# Patient Record
Sex: FEMALE | Race: WHITE | NOT HISPANIC OR LATINO | Employment: FULL TIME | URBAN - METROPOLITAN AREA
[De-identification: names, ages, dates, MRNs, and addresses within clinical notes are randomized per-mention and may not be internally consistent; named-entity substitution may affect disease eponyms.]

---

## 2018-08-24 ENCOUNTER — HOSPITAL ENCOUNTER (EMERGENCY)
Facility: HOSPITAL | Age: 53
Discharge: HOME | End: 2018-08-24
Attending: EMERGENCY MEDICINE | Admitting: EMERGENCY MEDICINE

## 2018-08-24 ENCOUNTER — APPOINTMENT (OUTPATIENT)
Dept: GENERAL RADIOLOGY | Facility: HOSPITAL | Age: 53
End: 2018-08-24

## 2018-08-24 ENCOUNTER — APPOINTMENT (OUTPATIENT)
Dept: CT IMAGING | Facility: HOSPITAL | Age: 53
End: 2018-08-24

## 2018-08-24 VITALS
HEIGHT: 68 IN | BODY MASS INDEX: 23.81 KG/M2 | TEMPERATURE: 98.3 F | WEIGHT: 157.13 LBS | SYSTOLIC BLOOD PRESSURE: 117 MMHG | DIASTOLIC BLOOD PRESSURE: 80 MMHG | OXYGEN SATURATION: 99 % | RESPIRATION RATE: 18 BRPM | HEART RATE: 64 BPM

## 2018-08-24 DIAGNOSIS — K20.90 ESOPHAGITIS: Primary | ICD-10-CM

## 2018-08-24 PROCEDURE — 71275 CT ANGIOGRAPHY CHEST: CPT

## 2018-08-24 PROCEDURE — 99284 EMERGENCY DEPT VISIT MOD MDM: CPT

## 2018-08-24 PROCEDURE — 93005 ELECTROCARDIOGRAM TRACING: CPT

## 2018-08-24 PROCEDURE — 85610 PROTHROMBIN TIME: CPT | Performed by: EMERGENCY MEDICINE

## 2018-08-24 PROCEDURE — 0 IOPAMIDOL PER 1 ML: Performed by: EMERGENCY MEDICINE

## 2018-08-24 PROCEDURE — 93010 ELECTROCARDIOGRAM REPORT: CPT | Performed by: INTERNAL MEDICINE

## 2018-08-24 PROCEDURE — 85025 COMPLETE CBC W/AUTO DIFF WBC: CPT | Performed by: EMERGENCY MEDICINE

## 2018-08-24 PROCEDURE — 71046 X-RAY EXAM CHEST 2 VIEWS: CPT

## 2018-08-24 PROCEDURE — 83690 ASSAY OF LIPASE: CPT | Performed by: EMERGENCY MEDICINE

## 2018-08-24 PROCEDURE — 84484 ASSAY OF TROPONIN QUANT: CPT | Performed by: EMERGENCY MEDICINE

## 2018-08-24 PROCEDURE — 80053 COMPREHEN METABOLIC PANEL: CPT | Performed by: EMERGENCY MEDICINE

## 2018-08-24 PROCEDURE — 93005 ELECTROCARDIOGRAM TRACING: CPT | Performed by: EMERGENCY MEDICINE

## 2018-08-24 RX ORDER — PANTOPRAZOLE SODIUM 40 MG/1
40 TABLET, DELAYED RELEASE ORAL 2 TIMES DAILY
Qty: 60 TABLET | Refills: 0 | Status: SHIPPED | OUTPATIENT
Start: 2018-08-24 | End: 2021-06-08 | Stop reason: ALTCHOICE

## 2018-08-24 RX ORDER — MEMANTINE HYDROCHLORIDE 10 MG/1
10 TABLET ORAL 2 TIMES DAILY
COMMUNITY
End: 2021-06-08 | Stop reason: ALTCHOICE

## 2018-08-24 RX ADMIN — IOPAMIDOL 95 ML: 755 INJECTION, SOLUTION INTRAVENOUS at 10:18

## 2018-08-29 ENCOUNTER — TELEPHONE (OUTPATIENT)
Dept: SOCIAL WORK | Facility: HOSPITAL | Age: 53
End: 2018-08-29

## 2018-08-29 NOTE — TELEPHONE ENCOUNTER
ED f/u phone call to offer to arrange a PCP len't. Patient stated that she will work on this on her own. No questions/concerns

## 2020-12-16 ENCOUNTER — APPOINTMENT (OUTPATIENT)
Dept: WOMENS IMAGING | Facility: HOSPITAL | Age: 55
End: 2020-12-16

## 2020-12-16 PROCEDURE — 77063 BREAST TOMOSYNTHESIS BI: CPT | Performed by: RADIOLOGY

## 2020-12-16 PROCEDURE — 77067 SCR MAMMO BI INCL CAD: CPT | Performed by: RADIOLOGY

## 2021-06-08 ENCOUNTER — OFFICE VISIT (OUTPATIENT)
Dept: INTERNAL MEDICINE | Facility: CLINIC | Age: 56
End: 2021-06-08

## 2021-06-08 VITALS
HEART RATE: 78 BPM | BODY MASS INDEX: 24.86 KG/M2 | TEMPERATURE: 97.2 F | WEIGHT: 164 LBS | HEIGHT: 68 IN | DIASTOLIC BLOOD PRESSURE: 48 MMHG | SYSTOLIC BLOOD PRESSURE: 92 MMHG

## 2021-06-08 DIAGNOSIS — Z11.59 ENCOUNTER FOR HEPATITIS C SCREENING TEST FOR LOW RISK PATIENT: ICD-10-CM

## 2021-06-08 DIAGNOSIS — Z00.00 PHYSICAL EXAM, ANNUAL: ICD-10-CM

## 2021-06-08 DIAGNOSIS — Z12.11 SCREEN FOR COLON CANCER: ICD-10-CM

## 2021-06-08 DIAGNOSIS — R91.1 PULMONARY NODULE, RIGHT: Primary | ICD-10-CM

## 2021-06-08 PROCEDURE — 99386 PREV VISIT NEW AGE 40-64: CPT | Performed by: INTERNAL MEDICINE

## 2021-06-08 RX ORDER — DEXTROAMPHETAMINE SULFATE, DEXTROAMPHETAMINE SACCHARATE, AMPHETAMINE ASPARTATE MONOHYDRATE, AND AMPHETAMINE SULFATE 12.5; 12.5; 12.5; 12.5 MG/1; MG/1; MG/1; MG/1
1 CAPSULE, EXTENDED RELEASE ORAL DAILY
COMMUNITY

## 2021-06-08 RX ORDER — NITROFURANTOIN MACROCRYSTALS 50 MG/1
CAPSULE ORAL
COMMUNITY
Start: 2021-06-03

## 2021-06-08 RX ORDER — FAMOTIDINE 20 MG/1
20 TABLET, FILM COATED ORAL 2 TIMES DAILY
COMMUNITY

## 2021-06-08 RX ORDER — CHLORAL HYDRATE 500 MG
CAPSULE ORAL
COMMUNITY

## 2021-06-08 NOTE — PROGRESS NOTES
"Chief Complaint  New PT visit    Subjective          Suad Edwards presents to [facility]  History of Present Illness  Here to establish- hasn't had PCP in several years.  2 children 19 and 23-  Wants to get up to date on health maintenance issues.  Takes nitrofurantoin daily (most day) for recurrent UTI for about a year- urol is going to start to wean her.     Objective   Vital Signs:   BP 92/48   Pulse 78   Temp 97.2 °F (36.2 °C)   Ht 172.7 cm (68\")   Wt 74.4 kg (164 lb)   BMI 24.94 kg/m²     Physical Exam  Constitutional:       Appearance: Normal appearance.   Pulmonary:      Effort: Pulmonary effort is normal.        Result Review :                 Assessment and Plan    Diagnoses and all orders for this visit:    1. Pulmonary nodule, right (Primary)  Comments:  hasn't had f/u from 2018- will order  Orders:  -     CT Chest Without Contrast; Future    2. Screen for colon cancer  Comments:  referral made at her request  Orders:  -     Ambulatory Referral For Screening Colonoscopy    3. Physical exam, annual  Comments:  for f/u appt  Orders:  -     Comprehensive Metabolic Panel; Future  -     CBC & Differential; Future  -     Lipid Panel With / Chol / HDL Ratio; Future  -     Vitamin D 25 Hydroxy; Future  -     TSH; Future    4. Encounter for hepatitis C screening test for low risk patient  Comments:  ordered.  Orders:  -     Hepatitis C Antibody; Future        Follow Up   Return in about 2 months (around 8/8/2021) for Annual physical, Lab Before FUP.  Patient was given instructions and counseling regarding her condition or for health maintenance advice. Please see specific information pulled into the AVS if appropriate.       "

## 2021-06-24 ENCOUNTER — HOSPITAL ENCOUNTER (OUTPATIENT)
Dept: CT IMAGING | Facility: HOSPITAL | Age: 56
Discharge: HOME | End: 2021-06-24
Admitting: INTERNAL MEDICINE

## 2021-06-24 DIAGNOSIS — R91.1 PULMONARY NODULE, RIGHT: ICD-10-CM

## 2021-06-24 PROCEDURE — 71250 CT THORAX DX C-: CPT

## 2021-06-29 ENCOUNTER — TELEPHONE (OUTPATIENT)
Dept: INTERNAL MEDICINE | Facility: CLINIC | Age: 56
End: 2021-06-29

## 2021-06-29 NOTE — TELEPHONE ENCOUNTER
PATIENT RETURNED CALL REGARDING RESULTS OF CT SCAN.    PATIENT CALL BACK #: [phone] OR  [phone]

## 2021-08-30 ENCOUNTER — OFFICE VISIT (OUTPATIENT)
Dept: INTERNAL MEDICINE | Facility: CLINIC | Age: 56
End: 2021-08-30

## 2021-08-30 VITALS
WEIGHT: 166 LBS | SYSTOLIC BLOOD PRESSURE: 122 MMHG | HEIGHT: 68 IN | BODY MASS INDEX: 25.16 KG/M2 | DIASTOLIC BLOOD PRESSURE: 68 MMHG | TEMPERATURE: 96.9 F | HEART RATE: 84 BPM

## 2021-08-30 DIAGNOSIS — E03.9 HYPOTHYROIDISM, UNSPECIFIED TYPE: ICD-10-CM

## 2021-08-30 DIAGNOSIS — F98.8 ATTENTION DEFICIT DISORDER (ADD) WITHOUT HYPERACTIVITY: ICD-10-CM

## 2021-08-30 DIAGNOSIS — Z00.00 PHYSICAL EXAM, ANNUAL: ICD-10-CM

## 2021-08-30 DIAGNOSIS — K21.9 GASTROESOPHAGEAL REFLUX DISEASE, UNSPECIFIED WHETHER ESOPHAGITIS PRESENT: Primary | ICD-10-CM

## 2021-08-30 PROBLEM — R13.10 DYSPHAGIA: Status: ACTIVE | Noted: 2021-08-27

## 2021-08-30 PROBLEM — Z12.11 SCREENING FOR MALIGNANT NEOPLASM OF COLON: Status: ACTIVE | Noted: 2021-08-27

## 2021-08-30 PROBLEM — R13.10 SWALLOWING PAINFUL: Status: ACTIVE | Noted: 2021-08-27

## 2021-08-30 PROBLEM — R14.0 ABDOMINAL BLOATING: Status: RESOLVED | Noted: 2021-08-27 | Resolved: 2021-08-30

## 2021-08-30 PROBLEM — R14.0 ABDOMINAL BLOATING: Status: ACTIVE | Noted: 2021-08-27

## 2021-08-30 PROCEDURE — 99396 PREV VISIT EST AGE 40-64: CPT | Performed by: INTERNAL MEDICINE

## 2021-08-30 RX ORDER — OMEPRAZOLE 20 MG/1
20 CAPSULE, DELAYED RELEASE ORAL
COMMUNITY
Start: 2021-08-27 | End: 2022-08-27

## 2021-08-30 RX ORDER — LEVOTHYROXINE SODIUM 0.07 MG/1
75 TABLET ORAL DAILY
Qty: 90 TABLET | Refills: 1 | Status: SHIPPED | OUTPATIENT
Start: 2021-08-30 | End: 2022-03-03

## 2021-08-30 NOTE — PROGRESS NOTES
Subjective   Suad Edwards is a 56 y.o. female and is here for a comprehensive physical exam. The patient reports problems - fatigue.  She finally saw Dr. Love- has appt for EGD and c-scope - having some trouble with dysphagia- now on PPI and H2 blocker.  Pt is UTD with annual gyn exam and mammo - Dr. Joshua  She feels a bit more tired than normal and very dry skin and nails.  Able to work.          Social History     Socioeconomic History   • Marital status:      Spouse name: Not on file   • Number of children: Not on file   • Years of education: Not on file   • Highest education level: Not on file   Tobacco Use   • Smoking status: Never Smoker   • Smokeless tobacco: Never Used   Substance and Sexual Activity   • Alcohol use: No   • Drug use: No   • Sexual activity: Yes     Partners: Male     Birth control/protection: Surgical     Comment: vasectomy       Family History   Problem Relation Age of Onset   • Cancer Mother 67        breast   • Heart disease Father 90   • Diabetes Father           Past Medical History:   Diagnosis Date   • ADHD    • Depression           Current Outpatient Medications:   •  Amphet-Dextroamphet 3-Bead ER (Mydayis) 50 MG capsule sustained-release 24 hr, Take 1 capsule by mouth Daily., Disp: , Rfl:   •  BuPROPion HCl (WELLBUTRIN PO), Take 150 mg by mouth 3 (Three) Times a Day., Disp: , Rfl:   •  famotidine (PEPCID) 20 MG tablet, Take 20 mg by mouth 2 (Two) Times a Day., Disp: , Rfl:   •  Omega-3 Fatty Acids (fish oil) 1000 MG capsule capsule, Take  by mouth Daily With Breakfast., Disp: , Rfl:   •  omeprazole (priLOSEC) 20 MG capsule, Take 20 mg by mouth., Disp: , Rfl:   •  levothyroxine (Synthroid) 75 MCG tablet, Take 1 tablet by mouth Daily., Disp: 90 tablet, Rfl: 1  •  nitrofurantoin (MACRODANTIN) 50 MG capsule, , Disp: , Rfl:     Review of Systems    Review of Systems   Constitutional: Negative for fatigue and unexpected weight loss.   HENT: Positive for trouble  "swallowing.    Eyes: Negative for visual disturbance.   Respiratory: Negative for shortness of breath.    Cardiovascular: Negative for chest pain and palpitations.   Gastrointestinal: Negative for abdominal pain and blood in stool.   Endocrine: Negative for cold intolerance.   Genitourinary: Negative for breast lump and decreased libido.   Musculoskeletal: Negative for arthralgias and gait problem.   Neurological: Negative for memory problem.   Psychiatric/Behavioral: Negative for depressed mood.        There were no vitals filed for this visit.    Vitals:    08/30/21 0851   BP: 122/68   Pulse: 84   Temp: 96.9 °F (36.1 °C)   Weight: 75.3 kg (166 lb)   Height: 172.7 cm (68\")     Body mass index is 25.24 kg/m².  Wt Readings from Last 3 Encounters:   08/30/21 75.3 kg (166 lb)   06/08/21 74.4 kg (164 lb)   08/24/18 71.3 kg (157 lb 2 oz)      Objective     Physical Exam  Constitutional:       General: She is not in acute distress.  Eyes:      Conjunctiva/sclera: Conjunctivae normal.   Neck:      Thyroid: No thyromegaly or thyroid tenderness.   Cardiovascular:      Rate and Rhythm: Normal rate and regular rhythm.      Heart sounds: Normal heart sounds.   Pulmonary:      Effort: Pulmonary effort is normal.      Breath sounds: Normal breath sounds.   Abdominal:      General: Bowel sounds are normal.      Palpations: Abdomen is soft.   Lymphadenopathy:      Cervical: No cervical adenopathy.   Skin:     General: Skin is warm and dry.   Neurological:      Mental Status: She is alert and oriented to person, place, and time.   Psychiatric:         Behavior: Behavior normal.         Thought Content: Thought content normal.         Judgment: Judgment normal.         Procedures       Assessment/Plan         Diagnoses and all orders for this visit:    1. Gastroesophageal reflux disease, unspecified whether esophagitis present (Primary)  Comments:  await EGD- less symptoms on PPI currently    2. Hypothyroidism, unspecified " type  Comments:  new finding, will start levothyroxine 75 mcg daily and recheck in 3 mnths.  Orders:  -     TSH; Future  -     levothyroxine (Synthroid) 75 MCG tablet; Take 1 tablet by mouth Daily.  Dispense: 90 tablet; Refill: 1    3. Attention deficit disorder (ADD) without hyperactivity  Comments:  stable treatment per psychiatry    4. Physical exam, annual  Comments:  Discussed exercise recommendations- Shingrix vacc recommended after 3rd Cvid.  Recheck 3 months with TSH        Return in about 3 months (around 11/30/2021) for Recheck, Lab Before FUP.

## 2021-11-23 DIAGNOSIS — E03.9 HYPOTHYROIDISM, UNSPECIFIED TYPE: ICD-10-CM

## 2021-11-25 LAB — TSH SERPL DL<=0.005 MIU/L-ACNC: 1.72 UIU/ML (ref 0.45–4.5)

## 2021-12-01 ENCOUNTER — OFFICE VISIT (OUTPATIENT)
Dept: INTERNAL MEDICINE | Facility: CLINIC | Age: 56
End: 2021-12-01

## 2021-12-01 VITALS
HEART RATE: 70 BPM | HEIGHT: 68 IN | WEIGHT: 164 LBS | TEMPERATURE: 96.3 F | DIASTOLIC BLOOD PRESSURE: 64 MMHG | BODY MASS INDEX: 24.86 KG/M2 | SYSTOLIC BLOOD PRESSURE: 100 MMHG

## 2021-12-01 DIAGNOSIS — E03.9 HYPOTHYROIDISM, UNSPECIFIED TYPE: Primary | ICD-10-CM

## 2021-12-01 DIAGNOSIS — Z00.00 PHYSICAL EXAM, ANNUAL: ICD-10-CM

## 2021-12-01 DIAGNOSIS — K21.9 GASTROESOPHAGEAL REFLUX DISEASE, UNSPECIFIED WHETHER ESOPHAGITIS PRESENT: ICD-10-CM

## 2021-12-01 PROCEDURE — 99213 OFFICE O/P EST LOW 20 MIN: CPT | Performed by: INTERNAL MEDICINE

## 2021-12-01 RX ORDER — BUPROPION HYDROCHLORIDE 150 MG/1
150 TABLET ORAL DAILY
COMMUNITY
Start: 2021-09-30 | End: 2022-10-25 | Stop reason: SDUPTHER

## 2021-12-01 RX ORDER — BUPROPION HYDROCHLORIDE 300 MG/1
TABLET ORAL
COMMUNITY
Start: 2021-11-03

## 2021-12-01 NOTE — PROGRESS NOTES
"Chief Complaint  Heartburn    Subjective          Suad Edwards presents to [facility]  History of Present Illness  Here for thyroid f/u and GI issues.  She isn't having any GI issues now, no issues with regurgitation since the dilation.    She takes Wellbutrin 300 mg daily, on occ she can take 150 mg.   Feels psychiatry has been good.     Objective   Vital Signs:   /64   Pulse 70   Temp 96.3 °F (35.7 °C)   Ht 172 cm (67.72\")   Wt 74.4 kg (164 lb)   BMI 25.14 kg/m²     Physical Exam  Constitutional:       Appearance: Normal appearance.   Cardiovascular:      Rate and Rhythm: Normal rate and regular rhythm.   Musculoskeletal:      Right lower leg: No edema.      Left lower leg: No edema.        Result Review :   The following data was reviewed by: Rehana Johnson MD on 12/01/2021:  Common labs    Common Labsle 8/23/21 8/23/21 8/23/21    1003 1003 1003   Glucose 92     BUN 13     Creatinine 0.92     eGFR Non  Am 64     eGFR African Am 77     Sodium 138     Potassium 4.4     Chloride 103     Calcium 9.9     Total Protein 7.6     Albumin 4.20     Total Bilirubin 0.3     Alkaline Phosphatase 87     AST (SGOT) 24     ALT (SGPT) 30     WBC  6.39    Hemoglobin  13.8    Hematocrit  40.8    Platelets  200    Total Cholesterol   234 (A)   Triglycerides   161 (A)   HDL Cholesterol   44   LDL Cholesterol    161 (A)   (A) Abnormal value       Comments are available for some flowsheets but are not being displayed.                     Assessment and Plan    Diagnoses and all orders for this visit:    1. Hypothyroidism, unspecified type (Primary)  Comments:  now well replaced, no change.   Orders:  -     TSH; Future    2. Physical exam, annual  -     Comprehensive Metabolic Panel; Future  -     Lipid Panel With / Chol / HDL Ratio; Future    3. Gastroesophageal reflux disease, unspecified whether esophagitis present  Comments:  feeling better, watching diet, etc.         Follow Up "   Return in about 1 year (around 12/1/2022) for Annual physical, Lab Before FUP.  Patient was given instructions and counseling regarding her condition or for health maintenance advice. Please see specific information pulled into the AVS if appropriate.

## 2022-02-16 ENCOUNTER — APPOINTMENT (OUTPATIENT)
Dept: WOMENS IMAGING | Facility: HOSPITAL | Age: 57
End: 2022-02-16

## 2022-02-16 PROCEDURE — 77067 SCR MAMMO BI INCL CAD: CPT | Performed by: RADIOLOGY

## 2022-02-16 PROCEDURE — 77063 BREAST TOMOSYNTHESIS BI: CPT | Performed by: RADIOLOGY

## 2022-03-03 DIAGNOSIS — E03.9 HYPOTHYROIDISM, UNSPECIFIED TYPE: ICD-10-CM

## 2022-03-03 RX ORDER — LEVOTHYROXINE SODIUM 0.07 MG/1
TABLET ORAL
Qty: 90 TABLET | Refills: 1 | Status: SHIPPED | OUTPATIENT
Start: 2022-03-03 | End: 2022-07-19

## 2022-07-18 DIAGNOSIS — E03.9 HYPOTHYROIDISM, UNSPECIFIED TYPE: ICD-10-CM

## 2022-07-19 RX ORDER — LEVOTHYROXINE SODIUM 0.07 MG/1
TABLET ORAL
Qty: 90 TABLET | Refills: 1 | Status: SHIPPED | OUTPATIENT
Start: 2022-07-19 | End: 2023-03-07

## 2022-09-08 ENCOUNTER — TELEPHONE (OUTPATIENT)
Dept: INTERNAL MEDICINE | Facility: CLINIC | Age: 57
End: 2022-09-08

## 2022-09-08 NOTE — TELEPHONE ENCOUNTER
Caller: Suad Edwards    Relationship: Self    Best call back number: [phone]    What was the call regarding: PATIENT CALLED TO SEE IF DR FAUST WOULD AGREE TO TAKE THE PATIENT'S DAUGHTER AS A PATIENT. PATIENT'S DAUGHTER IS GABRIEL EDWARDS, RINKU 01    Do you require a callback: YES

## 2022-10-25 ENCOUNTER — TELEMEDICINE (OUTPATIENT)
Dept: INTERNAL MEDICINE | Facility: CLINIC | Age: 57
End: 2022-10-25

## 2022-10-25 ENCOUNTER — TELEPHONE (OUTPATIENT)
Dept: INTERNAL MEDICINE | Facility: CLINIC | Age: 57
End: 2022-10-25

## 2022-10-25 VITALS — WEIGHT: 173 LBS | BODY MASS INDEX: 26.52 KG/M2

## 2022-10-25 DIAGNOSIS — U07.1 COVID-19 VIRUS INFECTION: Primary | ICD-10-CM

## 2022-10-25 PROCEDURE — 99213 OFFICE O/P EST LOW 20 MIN: CPT | Performed by: INTERNAL MEDICINE

## 2022-10-25 RX ORDER — OMEPRAZOLE 40 MG/1
40 CAPSULE, DELAYED RELEASE ORAL
COMMUNITY
Start: 2022-10-12

## 2022-10-25 NOTE — PROGRESS NOTES
"Chief Complaint  Covid-19 Home Monitoring Video Visit    Subjective        Suad Edwards presents to [facility]  History of Present Illness  Mode of Visit: Video  Location of patient: home  Location of provider: [facility]  You have chosen to receive care through a telehealth visit.  Does the patient consent to use a video/audio connection for your medical care today? Yes  The visit included audio and video interaction. No technical issues occurred during this visit.    had positive test a couple of days ago- has been wearing a mask. Negative test 2 days ago but symptoms of congestion, etc started this am, now positive.    No fever.  Current symptoms include congestion, sore throat, fatigue.    Has only taken ibuprofen.   She has vaccine and booster x2  Discussed quarantine for 5 days then tight fitting mask for 5 more days as long as no fever and symptoms have improved.     Objective   Vital Signs:  Wt 78.5 kg (173 lb)   BMI 26.52 kg/m²   Estimated body mass index is 26.52 kg/m² as calculated from the following:    Height as of 12/1/21: 172 cm (67.72\").    Weight as of this encounter: 78.5 kg (173 lb).          Physical Exam  Constitutional:       General: She is not in acute distress.     Appearance: Normal appearance. She is not ill-appearing.        Result Review :                Assessment and Plan   Diagnoses and all orders for this visit:    1. COVID-19 virus infection (Primary)  Comments:  pros/cons of antiviral discussed- will treat symptoms and monitor closely- to call with any worsening symptoms or questions.              Follow Up   No follow-ups on file.  Patient was given instructions and counseling regarding her condition or for health maintenance advice. Please see specific information pulled into the AVS if appropriate.       "

## 2022-10-25 NOTE — TELEPHONE ENCOUNTER
Caller: Suad Edwards    Relationship to patient: Self    Best call back number: [phone]    Date of exposure: N/A    Date of positive COVID19 test: 10/25/22    Date if possible COVID19 exposure: N/A    COVID19 symptoms: STUFFY NOSE, SORE THROAT, SCRATCHY THROAT. CHEST CONGESTION, COUGH, SNEEZING     Date of initial quarantine: 10/28/22    Additional information or concerns: PATIENT TESTED POSITIVE FOR COVID-19 TODAY AT HOME, AND SHE IS WANTING TO SEE IF THERE IS SOMETHING SHE CAN TAKE FOR THIS     SHE SAID HER SYMPTOMS STARTED YESTERDAY, 10/24/22    What is the patients preferred pharmacy: [facility]/pharmacy #[id number] - [place], [place] - [address] AT [facility] - [phone]  - [phone]   [phone]

## 2022-12-05 ENCOUNTER — OFFICE VISIT (OUTPATIENT)
Dept: INTERNAL MEDICINE | Facility: CLINIC | Age: 57
End: 2022-12-05

## 2022-12-05 VITALS
HEART RATE: 68 BPM | DIASTOLIC BLOOD PRESSURE: 58 MMHG | WEIGHT: 174 LBS | HEIGHT: 68 IN | SYSTOLIC BLOOD PRESSURE: 122 MMHG | BODY MASS INDEX: 26.37 KG/M2

## 2022-12-05 DIAGNOSIS — R79.89 ELEVATED LFTS: Primary | ICD-10-CM

## 2022-12-05 DIAGNOSIS — F98.8 ATTENTION DEFICIT DISORDER (ADD) WITHOUT HYPERACTIVITY: ICD-10-CM

## 2022-12-05 DIAGNOSIS — Z00.00 PHYSICAL EXAM, ANNUAL: ICD-10-CM

## 2022-12-05 PROCEDURE — 99396 PREV VISIT EST AGE 40-64: CPT | Performed by: INTERNAL MEDICINE

## 2022-12-05 NOTE — PROGRESS NOTES
Subjective   Suad Edwards is a 57 y.o. female and is here for a comprehensive physical exam. The patient reports no problems.  Feels back to baseline after having had Covid in October.    Pt is UTD with annual gyn exam and mammo - Dr. Joshua    Get Prevnar 20 and flu shot last week.       Social History     Socioeconomic History   • Marital status:    Tobacco Use   • Smoking status: Never   • Smokeless tobacco: Never   Substance and Sexual Activity   • Alcohol use: No   • Drug use: No   • Sexual activity: Yes     Partners: Male     Birth control/protection: Surgical     Comment: vasectomy       Family History   Problem Relation Age of Onset   • Cancer Mother 67        breast   • Heart disease Father 90   • Diabetes Father           Past Medical History:   Diagnosis Date   • ADHD    • Depression           Current Outpatient Medications:   •  Amphet-Dextroamphet 3-Bead ER (Mydayis) 50 MG capsule sustained-release 24 hr, Take 1 capsule by mouth Daily., Disp: , Rfl:   •  buPROPion XL (WELLBUTRIN XL) 300 MG 24 hr tablet,  mg Tab, Oral, [id number], 0 Refill(s), Disp: , Rfl:   •  famotidine (PEPCID) 20 MG tablet, Take 20 mg by mouth 2 (Two) Times a Day., Disp: , Rfl:   •  hyoscyamine (LEVSIN) 0.125 MG SL tablet, , Disp: , Rfl:   •  levothyroxine (SYNTHROID, LEVOTHROID) 75 MCG tablet, TAKE 1 TABLET BY MOUTH EVERY DAY, Disp: 90 tablet, Rfl: 1  •  nitrofurantoin (MACRODANTIN) 50 MG capsule, , Disp: , Rfl:   •  Omega-3 Fatty Acids (fish oil) 1000 MG capsule capsule, Take  by mouth Daily With Breakfast., Disp: , Rfl:   •  omeprazole (priLOSEC) 40 MG capsule, Take 1 capsule by mouth Every Morning Before Breakfast., Disp: , Rfl:     Review of Systems    Review of Systems   Constitutional: Negative for fatigue and unexpected weight loss.   Eyes: Negative for visual disturbance.   Respiratory: Negative for shortness of breath.    Cardiovascular: Negative for chest pain and palpitations.   Gastrointestinal: Negative for  "abdominal pain and blood in stool.   Endocrine: Negative for cold intolerance.   Genitourinary: Negative for breast lump and decreased libido.   Musculoskeletal: Negative for arthralgias and gait problem.   Neurological: Negative for memory problem.   Psychiatric/Behavioral: Negative for depressed mood.        There were no vitals filed for this visit.    Vitals:    12/05/22 1519   BP: 122/58   Pulse: 68   Weight: 78.9 kg (174 lb)   Height: 172 cm (67.72\")     Body mass index is 26.68 kg/m².  Wt Readings from Last 3 Encounters:   12/05/22 78.9 kg (174 lb)   10/25/22 78.5 kg (173 lb)   12/01/21 74.4 kg (164 lb)      Objective     Physical Exam  Constitutional:       General: She is not in acute distress.  Eyes:      Conjunctiva/sclera: Conjunctivae normal.   Neck:      Thyroid: No thyromegaly.   Cardiovascular:      Rate and Rhythm: Normal rate and regular rhythm.      Heart sounds: Normal heart sounds.   Pulmonary:      Effort: Pulmonary effort is normal.      Breath sounds: Normal breath sounds.   Abdominal:      General: Bowel sounds are normal.      Palpations: Abdomen is soft.   Lymphadenopathy:      Cervical: No cervical adenopathy.   Skin:     General: Skin is warm and dry.   Neurological:      Mental Status: She is alert and oriented to person, place, and time.   Psychiatric:         Behavior: Behavior normal.         Thought Content: Thought content normal.         Judgment: Judgment normal.         Procedures       Assessment & Plan         Diagnoses and all orders for this visit:    1. Elevated LFTs (Primary)  Comments:  recheck as there is no clear cause- if remains elevated, full evaluation.   Orders:  -     Comprehensive Metabolic Panel; Future    2. Attention deficit disorder (ADD) without hyperactivity  Comments:  remains stable on meds per psychiatry.     3. Physical exam, annual  Comments:  discussed increasing exercise to 150 min/week. Shingrix vaccine at pharmacy.  Recheck as above.         Return " in about 6 months (around 6/5/2023) for Recheck.

## 2022-12-06 PROBLEM — Z12.11 SCREENING FOR MALIGNANT NEOPLASM OF COLON: Status: RESOLVED | Noted: 2021-08-27 | Resolved: 2022-12-06

## 2023-03-07 DIAGNOSIS — E03.9 HYPOTHYROIDISM, UNSPECIFIED TYPE: ICD-10-CM

## 2023-03-07 RX ORDER — LEVOTHYROXINE SODIUM 0.07 MG/1
TABLET ORAL
Qty: 90 TABLET | Refills: 1 | Status: SHIPPED | OUTPATIENT
Start: 2023-03-07 | End: 2023-04-03 | Stop reason: SDUPTHER

## 2023-03-24 ENCOUNTER — APPOINTMENT (OUTPATIENT)
Dept: WOMENS IMAGING | Facility: HOSPITAL | Age: 58
End: 2023-03-24
Payer: COMMERCIAL

## 2023-03-24 PROCEDURE — 77067 SCR MAMMO BI INCL CAD: CPT | Performed by: RADIOLOGY

## 2023-03-24 PROCEDURE — 77063 BREAST TOMOSYNTHESIS BI: CPT | Performed by: RADIOLOGY

## 2023-04-03 DIAGNOSIS — E03.9 HYPOTHYROIDISM, UNSPECIFIED TYPE: ICD-10-CM

## 2023-04-03 RX ORDER — LEVOTHYROXINE SODIUM 0.07 MG/1
75 TABLET ORAL DAILY
Qty: 90 TABLET | Refills: 1 | Status: SHIPPED | OUTPATIENT
Start: 2023-04-03

## 2023-04-03 NOTE — TELEPHONE ENCOUNTER
Caller: Suad Edwards    Relationship: Self    Best call back number: [phone]     Requested Prescriptions:   Requested Prescriptions     Pending Prescriptions Disp Refills   • levothyroxine (SYNTHROID, LEVOTHROID) 75 MCG tablet 90 tablet 1     Sig: Take 1 tablet by mouth Daily.        Pharmacy where request should be sent: [facility]/PHARMACY #[id number] - [facility] [place] - [address]. AT [facility] [phone]  - [phone] FX     Last office visit with prescribing clinician: 12/5/2022   Last telemedicine visit with prescribing clinician: 6/7/2023   Next office visit with prescribing clinician: 6/7/2023     Additional details provided by patient:     Does the patient have less than a 3 day supply:  [x] Yes  [] No    Would you like a call back once the refill request has been completed: [x] Yes [] No    If the office needs to give you a call back, can they leave a voicemail: [x] Yes [] No    Shannon Perera Rep   04/03/23 12:52 EDT

## 2023-06-02 ENCOUNTER — TELEPHONE (OUTPATIENT)
Dept: INTERNAL MEDICINE | Facility: CLINIC | Age: 58
End: 2023-06-02

## 2023-06-02 NOTE — TELEPHONE ENCOUNTER
Caller: Suad Edwards    Relationship: Self    Best call back number: [phone]     What was the call regarding: PATIENT CALLING WANTING TO KNOW IF SHE NEEDS LAB PRIOR TO HER APPOINTMENT. IF NOT ANSWER PLEASE LEAVER MESSAGE PER PATIENT REQUEST

## 2023-06-05 ENCOUNTER — OFFICE VISIT (OUTPATIENT)
Dept: INTERNAL MEDICINE | Facility: CLINIC | Age: 58
End: 2023-06-05
Payer: COMMERCIAL

## 2023-06-05 VITALS
BODY MASS INDEX: 26.14 KG/M2 | WEIGHT: 172.5 LBS | HEIGHT: 68 IN | TEMPERATURE: 97 F | DIASTOLIC BLOOD PRESSURE: 70 MMHG | SYSTOLIC BLOOD PRESSURE: 124 MMHG

## 2023-06-05 DIAGNOSIS — R79.89 ABNORMAL LFTS: Primary | ICD-10-CM

## 2023-06-05 DIAGNOSIS — K21.9 GASTROESOPHAGEAL REFLUX DISEASE, UNSPECIFIED WHETHER ESOPHAGITIS PRESENT: ICD-10-CM

## 2023-06-05 PROCEDURE — 99214 OFFICE O/P EST MOD 30 MIN: CPT | Performed by: INTERNAL MEDICINE

## 2023-06-05 NOTE — PROGRESS NOTES
"Chief Complaint  abnormal lft    Subjective        MareDAVID Edwards presents to [facility]  History of Present Illness here for f/u of abnormal LFTs- she did not have lab work done again but is scheduled today.   She has been able to change schedule to work 4 days- hoping to use that extra day to add more exercise.  Started intermittent fasting- can't tell much difference in weight but doesn't feel any RUQ pain like she did before.   She is on compounded HRT (DHEA, estrogen cream and progesterone) since April- feels energy is better.   Takes nitrofuantoin daily per urology- trying to get off per HRT. She had a mmg prior to starting the HRT.   Takes omeprazole daily, hasn't needed famotidine- hyoscyamine prn swallowing issues.     Objective   Vital Signs:  /70   Temp 97 °F (36.1 °C)   Ht 172 cm (67.72\")   Wt 78.2 kg (172 lb 8 oz)   BMI 26.45 kg/m²   Estimated body mass index is 26.45 kg/m² as calculated from the following:    Height as of this encounter: 172 cm (67.72\").    Weight as of this encounter: 78.2 kg (172 lb 8 oz).             Physical Exam  Constitutional:       Appearance: Normal appearance.   Cardiovascular:      Rate and Rhythm: Normal rate and regular rhythm.   Pulmonary:      Effort: Pulmonary effort is normal.   Abdominal:      General: Bowel sounds are normal.      Palpations: There is no hepatomegaly.      Tenderness: There is no abdominal tenderness.      Result Review :                   Assessment and Plan   Diagnoses and all orders for this visit:    1. Abnormal LFTs (Primary)  Comments:  check labs today and address as needed. hopefully with dietary changes will be better    2. Gastroesophageal reflux disease, unspecified whether esophagitis present  Comments:  should consider decreasing the omeprazole as well- will f/u             Follow Up   Return in about 6 months (around 12/5/2023) for Annual physical, Lab Today, Lab Before FUP.  Patient was given " instructions and counseling regarding her condition or for health maintenance advice. Please see specific information pulled into the AVS if appropriate.

## 2023-12-20 ENCOUNTER — OFFICE VISIT (OUTPATIENT)
Dept: INTERNAL MEDICINE | Facility: CLINIC | Age: 58
End: 2023-12-20
Payer: COMMERCIAL

## 2023-12-20 VITALS
TEMPERATURE: 97.3 F | SYSTOLIC BLOOD PRESSURE: 104 MMHG | WEIGHT: 168.2 LBS | BODY MASS INDEX: 25.49 KG/M2 | DIASTOLIC BLOOD PRESSURE: 66 MMHG | HEIGHT: 68 IN | HEART RATE: 76 BPM

## 2023-12-20 DIAGNOSIS — Z23 NEED FOR INFLUENZA VACCINATION: ICD-10-CM

## 2023-12-20 DIAGNOSIS — M54.2 NECK PAIN: Primary | ICD-10-CM

## 2023-12-20 RX ORDER — CYCLOBENZAPRINE HCL 10 MG
10 TABLET ORAL 3 TIMES DAILY PRN
Qty: 30 TABLET | Refills: 0 | Status: SHIPPED | OUTPATIENT
Start: 2023-12-20

## 2023-12-20 NOTE — PROGRESS NOTES
"Chief Complaint  Back Pain    Subjective        Suad Edwards presents to [facility]  Back Pain     Fell on some stairs over the weekend- hit the back of her head and upper back- she has had some problems there on and off for a long time with some numbness in L arm- did PT years ago, uses traction prn now. No LOC. Ice and ibuprofen since. She has had some worsening of the chronic pain in the L arm- burning around elbow, can tingle into wrist/hand.  No headaches, blurred vision.   Years ago had MRI cervical spine- herniated disc, saw neurosurgeon- thought best tx with PT- She has periodically done the exercises.      Objective   Vital Signs:  /66   Pulse 76   Temp 97.3 °F (36.3 °C) (Temporal)   Ht 172 cm (67.72\")   Wt 76.3 kg (168 lb 3.2 oz)   BMI 25.79 kg/m²   Estimated body mass index is 25.79 kg/m² as calculated from the following:    Height as of this encounter: 172 cm (67.72\").    Weight as of this encounter: 76.3 kg (168 lb 3.2 oz).             Physical Exam  Constitutional:       Appearance: Normal appearance.   Cardiovascular:      Rate and Rhythm: Regular rhythm.   Musculoskeletal:      Left shoulder: Decreased range of motion: mild. Decreased strength.      Cervical back: No swelling, deformity, rigidity or tenderness. Decreased range of motion: with some tenderness.      Thoracic back: Normal.        Back:       Comments: Slight decrease in    Lymphadenopathy:      Cervical: No cervical adenopathy.        Result Review :                   Assessment and Plan   Diagnoses and all orders for this visit:    1. Neck pain (Primary)  Comments:  exacerbated by fall - will treat with topical diclofenac, muscle relaxer and PT. Further imaging if fails to improve.  Orders:  -     Ambulatory Referral to Physical Therapy Evaluate and treat    2. Need for influenza vaccination  -     Fluzone (or Fluarix & Flulaval for VFC) >6mos    Other orders  -     cyclobenzaprine " (FLEXERIL) 10 MG tablet; Take 1 tablet by mouth 3 (Three) Times a Day As Needed for Muscle Spasms.  Dispense: 30 tablet; Refill: 0             Follow Up   No follow-ups on file.  Patient was given instructions and counseling regarding her condition or for health maintenance advice. Please see specific information pulled into the AVS if appropriate.         Answers submitted by the patient for this visit:  Other (Submitted on 12/20/2023)  Please describe your symptoms.: Fell on concrete steps where I have been having problems with previously herniated disc  in neck.  Pain and weakness in L arm and shoulder since then. Symptoms have been there for a while but are worse now. Hit back of head but its better now.  Have you had these symptoms before?: Yes  How long have you been having these symptoms?: Greater than 2 weeks  Please list any medications you are currently taking for this condition.: Ibuprofem  Please describe any probable cause for these symptoms. : Falling on concrete steps aggrevated it. Previous neck problems due to everyday life.  Primary Reason for Visit (Submitted on 12/20/2023)  What is the primary reason for your visit?: Other

## 2024-01-03 DIAGNOSIS — E03.9 HYPOTHYROIDISM, UNSPECIFIED TYPE: ICD-10-CM

## 2024-01-03 RX ORDER — LEVOTHYROXINE SODIUM 0.07 MG/1
75 TABLET ORAL DAILY
Qty: 90 TABLET | Refills: 1 | Status: SHIPPED | OUTPATIENT
Start: 2024-01-03

## 2024-04-23 ENCOUNTER — TRANSCRIBE ORDERS (OUTPATIENT)
Dept: WOMENS IMAGING | Facility: HOSPITAL | Age: 59
End: 2024-04-23
Payer: COMMERCIAL

## 2024-04-23 ENCOUNTER — HOSPITAL ENCOUNTER (OUTPATIENT)
Dept: PET IMAGING | Facility: HOSPITAL | Age: 59
Discharge: HOME | End: 2024-04-23
Payer: COMMERCIAL

## 2024-04-23 ENCOUNTER — APPOINTMENT (OUTPATIENT)
Dept: WOMENS IMAGING | Facility: HOSPITAL | Age: 59
End: 2024-04-23
Payer: COMMERCIAL

## 2024-04-23 DIAGNOSIS — M81.0 HIGH RISK FOR FRACTURE DUE TO OSTEOPOROSIS BY DEXA SCAN: Primary | ICD-10-CM

## 2024-04-23 DIAGNOSIS — M81.0 HIGH RISK FOR FRACTURE DUE TO OSTEOPOROSIS BY DEXA SCAN: ICD-10-CM

## 2024-04-23 PROCEDURE — 77063 BREAST TOMOSYNTHESIS BI: CPT | Performed by: RADIOLOGY

## 2024-04-23 PROCEDURE — 77067 SCR MAMMO BI INCL CAD: CPT | Performed by: RADIOLOGY

## 2024-04-23 PROCEDURE — 77080 DXA BONE DENSITY AXIAL: CPT

## 2024-05-07 ENCOUNTER — OFFICE VISIT (OUTPATIENT)
Dept: INTERNAL MEDICINE | Facility: CLINIC | Age: 59
End: 2024-05-07
Payer: COMMERCIAL

## 2024-05-07 VITALS
HEIGHT: 68 IN | WEIGHT: 164 LBS | SYSTOLIC BLOOD PRESSURE: 122 MMHG | HEART RATE: 70 BPM | DIASTOLIC BLOOD PRESSURE: 68 MMHG | BODY MASS INDEX: 24.86 KG/M2

## 2024-05-07 DIAGNOSIS — F98.8 ATTENTION DEFICIT DISORDER (ADD) WITHOUT HYPERACTIVITY: ICD-10-CM

## 2024-05-07 DIAGNOSIS — E78.5 DYSLIPIDEMIA: Chronic | ICD-10-CM

## 2024-05-07 DIAGNOSIS — Z00.00 PHYSICAL EXAM, ANNUAL: ICD-10-CM

## 2024-05-07 DIAGNOSIS — E03.9 ACQUIRED HYPOTHYROIDISM: Primary | ICD-10-CM

## 2024-05-07 PROCEDURE — 99396 PREV VISIT EST AGE 40-64: CPT | Performed by: INTERNAL MEDICINE

## 2024-07-06 DIAGNOSIS — E03.9 HYPOTHYROIDISM, UNSPECIFIED TYPE: ICD-10-CM

## 2024-07-08 RX ORDER — LEVOTHYROXINE SODIUM 0.07 MG/1
75 TABLET ORAL DAILY
Qty: 90 TABLET | Refills: 3 | Status: SHIPPED | OUTPATIENT
Start: 2024-07-08

## 2025-02-11 ENCOUNTER — OFFICE VISIT (OUTPATIENT)
Dept: INTERNAL MEDICINE | Facility: CLINIC | Age: 60
End: 2025-02-11
Payer: COMMERCIAL

## 2025-02-11 VITALS — HEART RATE: 87 BPM | OXYGEN SATURATION: 98 % | WEIGHT: 164 LBS | BODY MASS INDEX: 24.86 KG/M2 | HEIGHT: 68 IN

## 2025-02-11 DIAGNOSIS — J01.00 ACUTE NON-RECURRENT MAXILLARY SINUSITIS: Primary | ICD-10-CM

## 2025-02-11 DIAGNOSIS — T36.95XA ANTIBIOTIC-INDUCED YEAST INFECTION: ICD-10-CM

## 2025-02-11 DIAGNOSIS — B37.9 ANTIBIOTIC-INDUCED YEAST INFECTION: ICD-10-CM

## 2025-02-11 LAB
EXPIRATION DATE: NORMAL
FLUAV AG UPPER RESP QL IA.RAPID: NOT DETECTED
FLUBV AG UPPER RESP QL IA.RAPID: NOT DETECTED
INTERNAL CONTROL: NORMAL
Lab: NORMAL
SARS-COV-2 AG UPPER RESP QL IA.RAPID: NOT DETECTED

## 2025-02-11 PROCEDURE — 99213 OFFICE O/P EST LOW 20 MIN: CPT | Performed by: STUDENT IN AN ORGANIZED HEALTH CARE EDUCATION/TRAINING PROGRAM

## 2025-02-11 PROCEDURE — 87428 SARSCOV & INF VIR A&B AG IA: CPT | Performed by: STUDENT IN AN ORGANIZED HEALTH CARE EDUCATION/TRAINING PROGRAM

## 2025-02-11 RX ORDER — BENZONATATE 100 MG/1
100 CAPSULE ORAL 3 TIMES DAILY PRN
Qty: 30 CAPSULE | Refills: 0 | Status: SHIPPED | OUTPATIENT
Start: 2025-02-11

## 2025-02-11 RX ORDER — FLUCONAZOLE 150 MG/1
TABLET ORAL
Qty: 2 TABLET | Refills: 0 | Status: SHIPPED | OUTPATIENT
Start: 2025-02-11

## 2025-02-11 NOTE — PROGRESS NOTES
"Chief Complaint  Sinusitis    Subjective        Suad Edwards presents to [facility]  History of Present Illness    Presents to clinic today with complaints of sinus congestion, dry cough, chest congestion.  She states that the symptoms been present for a little less than a week and she has been taking ibuprofen for body aches as well as Mucinex for decongestion.  She states symptoms have worsened over the past 2 to 3 days, she does have a coworker who currently is being treated for bacterial pneumonia.  She feels like she has some shortness of breath but denies any chest pain    Objective   Vital Signs:  Pulse 87   Ht 172 cm (67.72\")   Wt 74.4 kg (164 lb)   SpO2 98%   BMI 25.14 kg/m²   Estimated body mass index is 25.14 kg/m² as calculated from the following:    Height as of this encounter: 172 cm (67.72\").    Weight as of this encounter: 74.4 kg (164 lb).          Physical Exam  Vitals reviewed.   Constitutional:       General: She is not in acute distress.     Appearance: Normal appearance. She is ill-appearing. She is not toxic-appearing.   HENT:      Head: Normocephalic and atraumatic.      Nose: Congestion present.      Mouth/Throat:      Pharynx: No oropharyngeal exudate or posterior oropharyngeal erythema.   Eyes:      General: No scleral icterus.     Conjunctiva/sclera: Conjunctivae normal.   Cardiovascular:      Rate and Rhythm: Normal rate and regular rhythm.      Heart sounds: Normal heart sounds.   Pulmonary:      Effort: Pulmonary effort is normal. No respiratory distress.      Breath sounds: Normal breath sounds. No wheezing.   Skin:     General: Skin is warm and dry.   Neurological:      Mental Status: She is alert and oriented to person, place, and time.   Psychiatric:         Mood and Affect: Mood normal.         Behavior: Behavior normal.        Result Review :                Assessment and Plan   Diagnoses and all orders for this visit:    1. Acute " non-recurrent maxillary sinusitis (Primary)  -     amoxicillin-clavulanate (AUGMENTIN) 875-125 MG per tablet; Take 1 tablet by mouth 2 (Two) Times a Day for 7 days.  Dispense: 14 tablet; Refill: 0  -     benzonatate (Tessalon Perles) 100 MG capsule; Take 1 capsule by mouth 3 (Three) Times a Day As Needed for Cough.  Dispense: 30 capsule; Refill: 0  -     POCT SARS-CoV-2 Antigen STEPHANE + Flu    2. Antibiotic-induced yeast infection  -     fluconazole (DIFLUCAN) 150 MG tablet; Take 1 dose. May repeat 1 more day if needed.  Dispense: 2 tablet; Refill: 0      Will plan to treat with Augmentin for bacterial sinusitis, does have notable maxillary tenderness and significant congestion on exam, clear lungs to auscultation.  Will send in Diflucan given history of yeast infections with antibiotics             Follow Up   No follow-ups on file.  Patient was given instructions and counseling regarding her condition or for health maintenance advice. Please see specific information pulled into the AVS if appropriate.

## 2025-02-25 ENCOUNTER — OFFICE VISIT (OUTPATIENT)
Dept: INTERNAL MEDICINE | Facility: CLINIC | Age: 60
End: 2025-02-25
Payer: COMMERCIAL

## 2025-02-25 VITALS
DIASTOLIC BLOOD PRESSURE: 62 MMHG | WEIGHT: 163 LBS | HEART RATE: 74 BPM | BODY MASS INDEX: 25.58 KG/M2 | HEIGHT: 67 IN | SYSTOLIC BLOOD PRESSURE: 112 MMHG

## 2025-02-25 DIAGNOSIS — R05.2 SUBACUTE COUGH: Primary | ICD-10-CM

## 2025-02-25 PROCEDURE — 99213 OFFICE O/P EST LOW 20 MIN: CPT | Performed by: INTERNAL MEDICINE

## 2025-02-25 NOTE — PROGRESS NOTES
"Chief Complaint  URI    Subjective        Suad Edwards presents to [facility]  History of Present Illness has been sick for a few weeks- first viral then probably secondary bacterial infection.  Saw Dr. Butterfield who put her on Augmentin-completed 7 day course. Helped with a little of the cough but still feels like her chest is tight and that there is phlegm in her chest she can't mobilize. Coworker with similar, dx pneumonia. The sinus symptoms are better.   She does feel SOB. No recent fevers. She si doing Mucinex, fluids, humidifier, etc without relief.     Objective   Vital Signs:  /62   Pulse 74   Ht 170.2 cm (67\")   Wt 73.9 kg (163 lb)   BMI 25.53 kg/m²   Estimated body mass index is 25.53 kg/m² as calculated from the following:    Height as of this encounter: 170.2 cm (67\").    Weight as of this encounter: 73.9 kg (163 lb).          Physical Exam  Constitutional:       Appearance: Normal appearance. She is not ill-appearing.   Cardiovascular:      Rate and Rhythm: Normal rate and regular rhythm.   Pulmonary:      Effort: Pulmonary effort is normal.      Comments: Diminished BS throughout- some improvement after MN treatment  Lymphadenopathy:      Cervical: No cervical adenopathy.        Result Review :                Assessment and Plan   Diagnoses and all orders for this visit:    1. Subacute cough (Primary)  Comments:  suspect related to recent viral illness- will treat with Airspura and hydration initially. Reassess if not improving.    Other orders  -     Albuterol-Budesonide 90-80 MCG/ACT aerosol; Inhale 2 puffs Every 6 (Six) Hours As Needed (cough).  Dispense: 10.7 g; Refill: 0             Follow Up   No follow-ups on file.  Patient was given instructions and counseling regarding her condition or for health maintenance advice. Please see specific information pulled into the AVS if appropriate.           "

## 2025-05-13 ENCOUNTER — OFFICE VISIT (OUTPATIENT)
Dept: INTERNAL MEDICINE | Facility: CLINIC | Age: 60
End: 2025-05-13
Payer: COMMERCIAL

## 2025-05-13 VITALS
BODY MASS INDEX: 26.21 KG/M2 | WEIGHT: 167 LBS | HEART RATE: 84 BPM | SYSTOLIC BLOOD PRESSURE: 112 MMHG | DIASTOLIC BLOOD PRESSURE: 58 MMHG | HEIGHT: 67 IN

## 2025-05-13 DIAGNOSIS — Z23 NEED FOR SHINGLES VACCINE: ICD-10-CM

## 2025-05-13 DIAGNOSIS — E03.9 ACQUIRED HYPOTHYROIDISM: Chronic | ICD-10-CM

## 2025-05-13 DIAGNOSIS — Z00.00 PHYSICAL EXAM, ANNUAL: Primary | ICD-10-CM

## 2025-05-13 DIAGNOSIS — R74.01 ELEVATED TRANSAMINASE LEVEL: Chronic | ICD-10-CM

## 2025-05-13 DIAGNOSIS — E78.5 DYSLIPIDEMIA: ICD-10-CM

## 2025-05-13 PROCEDURE — 99396 PREV VISIT EST AGE 40-64: CPT | Performed by: INTERNAL MEDICINE

## 2025-05-13 PROCEDURE — 90750 HZV VACC RECOMBINANT IM: CPT | Performed by: INTERNAL MEDICINE

## 2025-05-13 RX ORDER — PROGESTERONE 100 MG/1
100 CAPSULE ORAL DAILY
COMMUNITY

## 2025-05-13 NOTE — PROGRESS NOTES
Subjective   Suad Edwards is a 60 y.o. female and is here for a comprehensive physical exam. The patient reports problems - HRT?? Seeing wellness center- gives her estrogen cream and progesterone. Mother had breast cancer but they felt she was at low risk and with sx could take replacement.  .  LFT has been mildly elevated since @ 2019- no known work up done.   Pt is UTD with annual gyn exam and mammo   Seeing gyn regularly for reflux- general reflux precautions, PPI  Dentist recommended that she have a sleep study- bruxism- does not have pain but report wear of teeth.   Tries to cut back on sugar, does not drink alcohol regularly.       Social History     Socioeconomic History    Marital status:    Tobacco Use    Smoking status: Never     Passive exposure: Never    Smokeless tobacco: Never   Vaping Use    Vaping status: Never Used   Substance and Sexual Activity    Alcohol use: No    Drug use: No    Sexual activity: Yes     Partners: Male     Birth control/protection: Surgical     Comment: vasectomy       Family History   Problem Relation Age of Onset    Cancer Mother 67        breast    Heart disease Father 90    Diabetes Father           Past Medical History:   Diagnosis Date    ADHD     Depression           Current Outpatient Medications:     Amphet-Dextroamphet 3-Bead ER (Mydayis) 50 MG capsule sustained-release 24 hr, Take 1 capsule by mouth Daily., Disp: , Rfl:     buPROPion XL (WELLBUTRIN XL) 300 MG 24 hr tablet,  mg Tab, Oral, [id number], 0 Refill(s), Disp: , Rfl:     nitrofurantoin (MACRODANTIN) 50 MG capsule, , Disp: , Rfl:     Omega-3 Fatty Acids (fish oil) 1000 MG capsule capsule, Take  by mouth Daily With Breakfast., Disp: , Rfl:     omeprazole (priLOSEC) 40 MG capsule, Take 1 capsule by mouth Every Morning Before Breakfast., Disp: , Rfl:     Progesterone (PROMETRIUM) 100 MG capsule, Take 1 capsule by mouth Daily., Disp: , Rfl:     Review of Systems    Review of Systems     There were no vitals  "filed for this visit.    Vitals:    05/13/25 1327   BP: 112/58   Pulse: 84   Weight: 75.8 kg (167 lb)   Height: 171 cm (67.34\")     Body mass index is 25.89 kg/m².  Wt Readings from Last 3 Encounters:   05/13/25 75.8 kg (167 lb)   02/25/25 73.9 kg (163 lb)   02/11/25 74.4 kg (164 lb)      Objective     Physical Exam  Constitutional:       General: She is not in acute distress.  Eyes:      Conjunctiva/sclera: Conjunctivae normal.   Neck:      Thyroid: No thyromegaly.   Cardiovascular:      Rate and Rhythm: Normal rate and regular rhythm.      Heart sounds: Normal heart sounds.   Pulmonary:      Effort: Pulmonary effort is normal.      Breath sounds: Normal breath sounds.   Abdominal:      General: Bowel sounds are normal.      Palpations: Abdomen is soft. There is no hepatomegaly.   Musculoskeletal:      Right lower leg: No edema.      Left lower leg: No edema.   Lymphadenopathy:      Cervical: No cervical adenopathy.   Skin:     General: Skin is warm and dry.   Neurological:      Mental Status: She is alert and oriented to person, place, and time.   Psychiatric:         Behavior: Behavior normal.         Thought Content: Thought content normal.         Judgment: Judgment normal.         Procedures       Assessment & Plan         Diagnoses and all orders for this visit:    1. Physical exam, annual (Primary)  Comments:  exam is favorable- encouraged to see gyn- vaccines reviewed. Keep working on diet/exercise. - goal 150 min/week    2. Elevated transaminase level  Comments:  Needs eval- FIB-4 1.9- will start with ultrasound  Orders:  -     US Liver; Future    3. Need for shingles vaccine  -     Shingrix Vaccine    4. Acquired hypothyroidism  Comments:  TSH at goal    5. Dyslipidemia  Comments:  labs reviewed from outside- at goal        No follow-ups on file.           "

## 2025-05-28 ENCOUNTER — HOSPITAL ENCOUNTER (OUTPATIENT)
Dept: ULTRASOUND IMAGING | Facility: HOSPITAL | Age: 60
Discharge: HOME | End: 2025-05-28
Payer: COMMERCIAL

## 2025-05-28 ENCOUNTER — TELEPHONE (OUTPATIENT)
Dept: INTERNAL MEDICINE | Facility: CLINIC | Age: 60
End: 2025-05-28

## 2025-05-28 DIAGNOSIS — R74.01 ELEVATED TRANSAMINASE LEVEL: Chronic | ICD-10-CM

## 2025-05-28 NOTE — TELEPHONE ENCOUNTER
Caller: Suad Edwards    Relationship: Self    Best call back number: [phone]     What orders are you requesting (i.e. lab or imaging): ULTRASOUND OF LIVER    In what timeframe would the patient need to come in: 06/04/25 AT 9AM     Where will you receive your lab/imaging services: [facility]     Additional notes: PATIENT IS REQUESTING ORDER BE SENT TO [facility] AT FAX NUMBER : [phone]

## 2025-06-05 DIAGNOSIS — R74.01 ELEVATED TRANSAMINASE LEVEL: Primary | ICD-10-CM

## 2025-06-12 LAB
A1AT SERPL-MCNC: 149 MG/DL (ref 101–187)
CERULOPLASMIN SERPL-MCNC: 33.4 MG/DL (ref 19–39)
ENDOMYSIUM IGA SER QL: NEGATIVE
FERRITIN SERPL-MCNC: 41 NG/ML (ref 15–150)
GLIADIN PEPTIDE IGA SER-ACNC: 9 UNITS (ref 0–19)
GLIADIN PEPTIDE IGG SER-ACNC: 2 UNITS (ref 0–19)
HBV CORE AB SERPL QL IA: NEGATIVE
HBV SURFACE AB SER QL: REACTIVE
HBV SURFACE AG SERPL QL IA: NEGATIVE
HCV AB SERPL QL IA: NON REACTIVE
HCV AB SERPL QL IA: NORMAL
IGA SERPL-MCNC: 255 MG/DL (ref 87–352)
TTG IGA SER-ACNC: <2 U/ML (ref 0–3)
TTG IGG SER-ACNC: 5 U/ML (ref 0–5)

## 2025-08-06 ENCOUNTER — OFFICE VISIT (OUTPATIENT)
Dept: OBSTETRICS AND GYNECOLOGY | Age: 60
End: 2025-08-06
Payer: COMMERCIAL

## 2025-08-06 VITALS
SYSTOLIC BLOOD PRESSURE: 116 MMHG | BODY MASS INDEX: 25.99 KG/M2 | HEIGHT: 67 IN | WEIGHT: 165.6 LBS | DIASTOLIC BLOOD PRESSURE: 70 MMHG

## 2025-08-06 DIAGNOSIS — Z12.31 SCREENING MAMMOGRAM FOR BREAST CANCER: ICD-10-CM

## 2025-08-06 DIAGNOSIS — Z01.419 ENCOUNTER FOR GYNECOLOGICAL EXAMINATION: Primary | ICD-10-CM

## 2025-08-13 LAB
CYTOLOGIST CVX/VAG CYTO: NORMAL
CYTOLOGY CVX/VAG DOC CYTO: NORMAL
CYTOLOGY CVX/VAG DOC THIN PREP: NORMAL
DX ICD CODE: NORMAL
HPV I/H RISK 4 DNA CVX QL PROBE+SIG AMP: NEGATIVE
OTHER STN SPEC: NORMAL
PATHOLOGIST CVX/VAG CYTO: NORMAL
SERVICE CMNT-IMP: NORMAL
STAT OF ADQ CVX/VAG CYTO-IMP: NORMAL

## 2025-08-22 ENCOUNTER — TELEPHONE (OUTPATIENT)
Dept: OBSTETRICS AND GYNECOLOGY | Age: 60
End: 2025-08-22
Payer: COMMERCIAL